# Patient Record
Sex: FEMALE | HISPANIC OR LATINO | URBAN - METROPOLITAN AREA
[De-identification: names, ages, dates, MRNs, and addresses within clinical notes are randomized per-mention and may not be internally consistent; named-entity substitution may affect disease eponyms.]

---

## 2022-05-10 ENCOUNTER — OFFICE VISIT (OUTPATIENT)
Dept: URBAN - METROPOLITAN AREA CLINIC 46 | Facility: CLINIC | Age: 62
End: 2022-05-10
Payer: COMMERCIAL

## 2022-05-10 DIAGNOSIS — E11.9 DIABETES MELLITUS TYPE 2 WITHOUT MENTION OF COMPLICATION: Primary | ICD-10-CM

## 2022-05-10 DIAGNOSIS — H43.393 OTHER VITREOUS OPACITIES, BILATERAL: ICD-10-CM

## 2022-05-10 DIAGNOSIS — H25.13 AGE-RELATED NUCLEAR CATARACT, BILATERAL: ICD-10-CM

## 2022-05-10 DIAGNOSIS — H16.223 KERATOCONJUNCTIVITIS SICCA, BILATERAL: ICD-10-CM

## 2022-05-10 PROCEDURE — 92014 COMPRE OPH EXAM EST PT 1/>: CPT | Performed by: OPTOMETRIST

## 2022-05-10 PROCEDURE — 92134 CPTRZ OPH DX IMG PST SGM RTA: CPT | Performed by: OPTOMETRIST

## 2022-05-10 ASSESSMENT — INTRAOCULAR PRESSURE
OD: 21
OS: 17

## 2022-05-10 ASSESSMENT — KERATOMETRY
OS: 43.22
OD: 43.22

## 2022-05-10 NOTE — IMPRESSION/PLAN
Impression: Keratoconjunctivitis sicca, bilateral: T17.282. Plan: Dry eyes account for the patient's complaints. There is no evidence of permanent changes to the cornea. Explained condition does not have a cure and will need artificial tears for maintenance. Patient instructed to use artificial tears 4-6x/daily. Explained it may take time for eyes to acclimate completely to OTC regimen.

## 2022-05-10 NOTE — IMPRESSION/PLAN
Impression: Diabetes mellitus Type 2 without mention of complication: L76.4. Plan: Diabetes type II: No background retinopathy, no signs of neovascularization noted. Discussed ocular and systemic benefits of blood sugar control. Discussed risks of progression. Patient to call if signs are symptoms should arise.